# Patient Record
Sex: MALE | Race: WHITE | NOT HISPANIC OR LATINO | Employment: FULL TIME | URBAN - METROPOLITAN AREA
[De-identification: names, ages, dates, MRNs, and addresses within clinical notes are randomized per-mention and may not be internally consistent; named-entity substitution may affect disease eponyms.]

---

## 2017-08-15 ENCOUNTER — ALLSCRIPTS OFFICE VISIT (OUTPATIENT)
Dept: OTHER | Facility: OTHER | Age: 32
End: 2017-08-15

## 2017-08-15 LAB
BILIRUB UR QL STRIP: NORMAL
CLARITY UR: NORMAL
COLOR UR: YELLOW
GLUCOSE (HISTORICAL): NORMAL
HGB UR QL STRIP.AUTO: NORMAL
KETONES UR STRIP-MCNC: NORMAL MG/DL
LEUKOCYTE ESTERASE UR QL STRIP: NORMAL
NITRITE UR QL STRIP: NORMAL
PH UR STRIP.AUTO: 8 [PH]
PROT UR STRIP-MCNC: NORMAL MG/DL
SP GR UR STRIP.AUTO: 1.01
UROBILINOGEN UR QL STRIP.AUTO: NORMAL

## 2017-11-13 ENCOUNTER — GENERIC CONVERSION - ENCOUNTER (OUTPATIENT)
Dept: OTHER | Facility: OTHER | Age: 32
End: 2017-11-13

## 2017-11-13 ENCOUNTER — GENERIC CONVERSION - ENCOUNTER (OUTPATIENT)
Dept: INTERNAL MEDICINE CLINIC | Age: 32
End: 2017-11-13

## 2018-01-13 VITALS
BODY MASS INDEX: 26.77 KG/M2 | HEIGHT: 70 IN | WEIGHT: 187 LBS | TEMPERATURE: 98 F | HEART RATE: 80 BPM | DIASTOLIC BLOOD PRESSURE: 76 MMHG | SYSTOLIC BLOOD PRESSURE: 116 MMHG
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
## 2018-01-22 VITALS
HEIGHT: 70 IN | TEMPERATURE: 98.4 F | BODY MASS INDEX: 26.99 KG/M2 | RESPIRATION RATE: 16 BRPM | WEIGHT: 188.5 LBS | OXYGEN SATURATION: 98 % | SYSTOLIC BLOOD PRESSURE: 108 MMHG | DIASTOLIC BLOOD PRESSURE: 68 MMHG | HEART RATE: 77 BPM

## 2018-01-22 VITALS — OXYGEN SATURATION: 99 %

## 2018-07-06 ENCOUNTER — OFFICE VISIT (OUTPATIENT)
Dept: INTERNAL MEDICINE CLINIC | Age: 33
End: 2018-07-06
Payer: COMMERCIAL

## 2018-07-06 VITALS
OXYGEN SATURATION: 95 % | HEART RATE: 62 BPM | BODY MASS INDEX: 26.43 KG/M2 | DIASTOLIC BLOOD PRESSURE: 60 MMHG | WEIGHT: 184.6 LBS | HEIGHT: 70 IN | SYSTOLIC BLOOD PRESSURE: 96 MMHG | TEMPERATURE: 97.5 F

## 2018-07-06 DIAGNOSIS — K04.7 DENTAL ABSCESS: Primary | ICD-10-CM

## 2018-07-06 PROBLEM — K40.90 INGUINAL HERNIA: Status: ACTIVE | Noted: 2017-08-15

## 2018-07-06 PROBLEM — D17.1 LIPOMA OF TORSO: Status: ACTIVE | Noted: 2017-11-13

## 2018-07-06 PROCEDURE — 3725F SCREEN DEPRESSION PERFORMED: CPT | Performed by: INTERNAL MEDICINE

## 2018-07-06 PROCEDURE — 99213 OFFICE O/P EST LOW 20 MIN: CPT | Performed by: INTERNAL MEDICINE

## 2018-07-06 PROCEDURE — 1036F TOBACCO NON-USER: CPT | Performed by: INTERNAL MEDICINE

## 2018-07-06 PROCEDURE — 3008F BODY MASS INDEX DOCD: CPT | Performed by: INTERNAL MEDICINE

## 2018-07-06 RX ORDER — AMOXICILLIN 500 MG/1
500 CAPSULE ORAL EVERY 8 HOURS SCHEDULED
Qty: 30 CAPSULE | Refills: 0 | Status: SHIPPED | OUTPATIENT
Start: 2018-07-06 | End: 2018-07-16

## 2018-07-06 RX ORDER — IBUPROFEN 800 MG/1
800 TABLET ORAL EVERY 8 HOURS PRN
Qty: 30 TABLET | Refills: 0 | Status: SHIPPED | OUTPATIENT
Start: 2018-07-06 | End: 2019-08-06 | Stop reason: SDUPTHER

## 2018-07-08 NOTE — PROGRESS NOTES
Assessment/Plan:    No problem-specific Assessment & Plan notes found for this encounter  Diagnoses and all orders for this visit:    Dental abscess  Comments:  Patient has a toothache for 4 days is trying to get into a dentist next week  By some on amoxicillin on and ibuprofen for pain control  Orders:  -     amoxicillin (AMOXIL) 500 mg capsule; Take 1 capsule (500 mg total) by mouth every 8 (eight) hours for 10 days  -     ibuprofen (MOTRIN) 800 mg tablet; Take 1 tablet (800 mg total) by mouth every 8 (eight) hours as needed for mild pain          Subjective:      Patient ID: Aure Diez is a 29 y o  male  Patient has not been to a dentist for a while and developed a sore to last week  However the pain is increasing intensity  He has no fever, significant swelling but does have intense throbbing of his lower jaw on the right  Review of Systems   Constitutional: Negative  HENT: Positive for dental problem  Eyes: Negative  Respiratory: Negative  Cardiovascular: Negative  Objective:      BP 96/60 (BP Location: Left arm, Patient Position: Sitting, Cuff Size: Standard)   Pulse 62   Temp 97 5 °F (36 4 °C) (Tympanic)   Ht 5' 10" (1 778 m)   Wt 83 7 kg (184 lb 9 6 oz)   SpO2 95%   BMI 26 49 kg/m²          Physical Exam   Constitutional: He appears well-developed and well-nourished  HENT:   Head: Normocephalic and atraumatic  Broken and decayed to right lower jaw   Eyes: Conjunctivae are normal  Pupils are equal, round, and reactive to light  Neck: Normal range of motion  Neck supple  Cardiovascular: Regular rhythm  Pulmonary/Chest: Effort normal and breath sounds normal    Lymphadenopathy:     He has cervical adenopathy  Vitals reviewed

## 2019-04-20 ENCOUNTER — APPOINTMENT (EMERGENCY)
Dept: RADIOLOGY | Facility: HOSPITAL | Age: 34
End: 2019-04-20
Payer: COMMERCIAL

## 2019-04-20 ENCOUNTER — HOSPITAL ENCOUNTER (EMERGENCY)
Facility: HOSPITAL | Age: 34
Discharge: HOME | End: 2019-04-20
Admitting: EMERGENCY MEDICINE
Payer: COMMERCIAL

## 2019-04-20 VITALS
HEART RATE: 61 BPM | WEIGHT: 173.72 LBS | DIASTOLIC BLOOD PRESSURE: 79 MMHG | RESPIRATION RATE: 18 BRPM | OXYGEN SATURATION: 95 % | SYSTOLIC BLOOD PRESSURE: 129 MMHG | TEMPERATURE: 98.3 F | BODY MASS INDEX: 24.93 KG/M2

## 2019-04-20 DIAGNOSIS — S96.912A ANKLE STRAIN, LEFT, INITIAL ENCOUNTER: Primary | ICD-10-CM

## 2019-04-20 PROCEDURE — 73590 X-RAY EXAM OF LOWER LEG: CPT

## 2019-04-20 PROCEDURE — 99283 EMERGENCY DEPT VISIT LOW MDM: CPT | Performed by: PHYSICIAN ASSISTANT

## 2019-04-20 PROCEDURE — 99283 EMERGENCY DEPT VISIT LOW MDM: CPT

## 2019-07-08 ENCOUNTER — OFFICE VISIT (OUTPATIENT)
Dept: INTERNAL MEDICINE CLINIC | Age: 34
End: 2019-07-08
Payer: COMMERCIAL

## 2019-07-08 VITALS
DIASTOLIC BLOOD PRESSURE: 60 MMHG | HEIGHT: 70 IN | HEART RATE: 80 BPM | SYSTOLIC BLOOD PRESSURE: 124 MMHG | TEMPERATURE: 98.9 F | WEIGHT: 182 LBS | OXYGEN SATURATION: 97 % | BODY MASS INDEX: 26.05 KG/M2

## 2019-07-08 DIAGNOSIS — K04.7 DENTAL ABSCESS: Primary | ICD-10-CM

## 2019-07-08 PROBLEM — S82.142A CLOSED DISPLACED BICONDYLAR FRACTURE OF LEFT TIBIA: Status: RESOLVED | Noted: 2019-01-03 | Resolved: 2019-07-08

## 2019-07-08 PROBLEM — D17.1 LIPOMA OF TORSO: Status: RESOLVED | Noted: 2017-11-13 | Resolved: 2019-07-08

## 2019-07-08 PROBLEM — S82.142A CLOSED DISPLACED BICONDYLAR FRACTURE OF LEFT TIBIA: Status: ACTIVE | Noted: 2019-01-03

## 2019-07-08 PROCEDURE — 3725F SCREEN DEPRESSION PERFORMED: CPT | Performed by: INTERNAL MEDICINE

## 2019-07-08 PROCEDURE — 1036F TOBACCO NON-USER: CPT | Performed by: INTERNAL MEDICINE

## 2019-07-08 PROCEDURE — 99213 OFFICE O/P EST LOW 20 MIN: CPT | Performed by: INTERNAL MEDICINE

## 2019-07-08 PROCEDURE — 3008F BODY MASS INDEX DOCD: CPT | Performed by: INTERNAL MEDICINE

## 2019-07-08 RX ORDER — AMOXICILLIN AND CLAVULANATE POTASSIUM 875; 125 MG/1; MG/1
1 TABLET, FILM COATED ORAL EVERY 12 HOURS SCHEDULED
Qty: 20 TABLET | Refills: 0 | Status: SHIPPED | OUTPATIENT
Start: 2019-07-08 | End: 2019-07-18

## 2019-07-08 NOTE — PROGRESS NOTES
Assessment/Plan:    Dental abscess  Over the past week he has developed increasing jaw pain on the right radiating into his right ear                                                       Diagnoses and all orders for this visit:    Dental abscess  -     amoxicillin-clavulanate (AUGMENTIN) 875-125 mg per tablet; Take 1 tablet by mouth every 12 (twelve) hours for 10 days          Subjective:      Patient ID: Harmony Lyons is a 34 y o  male  Dental Pain    This is a new problem  The current episode started in the past 7 days  The problem occurs constantly  The problem has been gradually worsening  The pain is at a severity of 8/10  The pain is severe  Associated symptoms include facial pain and thermal sensitivity  Pertinent negatives include no difficulty swallowing, fever, oral bleeding or sinus pressure  He has tried NSAIDs and acetaminophen for the symptoms  The treatment provided no relief  Review of Systems   Constitutional: Negative for chills, fatigue, fever and unexpected weight change  HENT: Negative for congestion, ear pain, hearing loss, postnasal drip, sinus pressure, sore throat, trouble swallowing and voice change  Broken teeth with cavities   Eyes: Negative for visual disturbance  Respiratory: Negative for cough, chest tightness, shortness of breath and wheezing  Cardiovascular: Negative for chest pain, palpitations and leg swelling  Gastrointestinal: Negative for abdominal distention, abdominal pain, anal bleeding, blood in stool, constipation, diarrhea and nausea  Endocrine: Negative for cold intolerance, polydipsia, polyphagia and polyuria  Genitourinary: Negative for dysuria, flank pain, frequency, hematuria and urgency  Musculoskeletal: Negative for arthralgias, back pain, gait problem, joint swelling, myalgias and neck pain  Skin: Negative for rash  Allergic/Immunologic: Negative for immunocompromised state     Neurological: Negative for dizziness, syncope, facial asymmetry, weakness, light-headedness, numbness and headaches  Hematological: Negative for adenopathy  Psychiatric/Behavioral: Negative for confusion, sleep disturbance and suicidal ideas  Objective:      /60 (BP Location: Left arm, Patient Position: Sitting)   Pulse 80   Temp 98 9 °F (37 2 °C) (Tympanic)   Ht 5' 10" (1 778 m)   Wt 82 6 kg (182 lb)   SpO2 97%   BMI 26 11 kg/m²          Physical Exam   Constitutional: He is oriented to person, place, and time  He appears well-developed and well-nourished  No distress  HENT:   Right Ear: External ear normal    Left Ear: External ear normal    Nose: Nose normal    Mouth/Throat: No oropharyngeal exudate  Broken teeth with cavities   Eyes: Pupils are equal, round, and reactive to light  EOM are normal    Neck: Normal range of motion  Neck supple  No JVD present  No thyromegaly present  Cardiovascular: Normal rate, regular rhythm, normal heart sounds and intact distal pulses  Exam reveals no gallop  No murmur heard  Pulmonary/Chest: Effort normal and breath sounds normal  No respiratory distress  He has no wheezes  He has no rales  Abdominal: Soft  Bowel sounds are normal  He exhibits no distension and no mass  There is no tenderness  Musculoskeletal: Normal range of motion  He exhibits no tenderness  Lymphadenopathy:     He has no cervical adenopathy  Neurological: He is alert and oriented to person, place, and time  No cranial nerve deficit  Coordination normal    Skin: No rash noted  Psychiatric: He has a normal mood and affect   His behavior is normal  Judgment and thought content normal

## 2019-07-08 NOTE — PATIENT INSTRUCTIONS
Dental Abscess   AMBULATORY CARE:   A dental abscess  is a collection of pus in or around a tooth  A dental abscess is caused by bacteria  The bacteria usually enter the tooth when the enamel (outer part of the tooth) is damaged by tooth decay  Bacteria may also enter the tooth through a break or chip in the tooth, or a cut in the gum  Food particles that are stuck between the teeth for a long time may also lead to an abscess  Signs and symptoms of a dental abscess:   · Toothache, a loose tooth, or a tooth that is very sensitive to pressure or temperature    · Bad breath, unpleasant taste, and drooling    · Fever    · Pain, redness, and swelling of the gums, or swelling of your face and neck    · Pain when you open or close your mouth    · Trouble opening your mouth  Seek care immediately if:   · You have severe pain  · You have trouble breathing because of pain or swelling  Contact your healthcare provider if:   · Your symptoms get worse, even after treatment  · Your mouth is bleeding  · You cannot eat or drink because of pain or swelling  · Your abscess returns  · You have an injury that causes a crack in your tooth  · You have questions or concerns about your condition or care  Treatment:  You may  need any of the following:  · Medicines  may be given to treat a bacterial infection and decrease pain  · Incision and drainage  is a cut in the abscess to allow the pus to drain  A sample of fluid may be collected from your abscess  The fluid is sent to a lab and tested for bacteria  Ask your healthcare provider for more information  · A root canal  is a procedure to remove the bacteria and prevent more infection  It is usually done after an incision and drainage  A filling or crown will be placed over the tooth after you have healed from your root canal      · Tooth removal  may be needed if the infection affects deeper tissues   This is usually done after an incision and drainage  Self-care:   · Rinse your mouth every 2 hours with salt water  This will help keep the area clean  · Gently brush your teeth twice a day with a soft tooth brush  This will help keep the area clean  · Eat soft foods as directed  Soft foods may cause less pain  Examples include applesauce, yogurt, and cooked pasta  Ask your healthcare provider how long to follow this instruction  · Apply a warm compress to your tooth or gum  Use a cotton ball or gauze soaked in warm water  Remove the compress in 10 minutes or when it becomes cool  Repeat 3 times a day  Prevent another abscess:   · Brush your teeth at least 2 times a day with fluoride toothpaste  · Use dental floss to clean between your teeth at least once a day  · Rinse your mouth with water or mouthwash after meals and snacks  · Chew sugarless gum after meals and snacks  · Limit foods that are sticky and high in sugar such as raisons  Also limit drinks high in sugar, such as soda  · See your dentist every 6 months for dental cleanings and oral exams  Follow up with your healthcare provider in 24 hours: Your healthcare provider will need to check your teeth and gums  Write down your questions so you remember to ask them during your visits  © 2017 [employer]  Information is for End User's use only and may not be sold, redistributed or otherwise used for commercial purposes  All illustrations and images included in CareNotes® are the copyrighted property of [employer]  or [employer]  The above information is an  only  It is not intended as medical advice for individual conditions or treatments  Talk to your doctor, nurse or pharmacist before following any medical regimen to see if it is safe and effective for you  ÎÑÇÌ ÇáÓä   ÇáÑÚÇíÉ ÇáÎÇÑÌíÉ ááãÑÖì:   ÎÑÇÌ ÇáÓä  åæ ÊÌãÚ ÇáÕÏíÏ ÏÇÎá ÇáÓä Ãæ Íæáå  íÍÏË ÎÑÇÌ ÇáÓä ÈÓÈÈ ÇáÈßÊíÑíÇ   ÛÇáÈÇ ãÇ ÊÏÎá ÇáÈßÊíÑíÇ Åáì ÏÇÎá ÇáÓä ÚäÏ ÊÚÑÖ ãíäÇ ÇáÃÓäÇä (AAOPRW ÇáÎÇÑÌí ááÃÓäÇä) ááÊáÝ ÈÓÈÈ äÎÑ ÇáÃÓäÇä  ÞÏ ÊÏÎá ÇáÈßÊÑíÇ Åáì ÇáÓä ÃíÖðÇ ãä ÎáÇá ßÓÑ Ãæ ÑÞÇÆÞ ãæÌæÏÉ Ýí ÇáÓä¡ Ãæ æÌæÏ ÌÑÍ Ýí ÇááËÉ  æÞÏ ÊÄÏí ÈÞÇíÇ ÇáØÚÇã BUNDJXGZ Èíä ÇáÃÓäÇä áÝÊÑÉ ØæíáÉ Åáì ÇáÅÕÇÈÉ ÈÇáÎÑÇÌ  ÃÚÑÇÖ ÎÑÇÌ ÇáÓä æÚáÇãÇÊå:   · Ãáã ÇáÃÓäÇä Ãæ ÃÓäÇä THJEWE Ãæ ÍÓÇÓíÉ ÇáÃÓäÇä ááÖÛØ Ãæ ÇáÍÑÇÑÉ    · ÇáäÝÓ ÇáßÑíå æÇáãÐÇÞ ÛíÑ ÇáãÓÊÍÈ æÓíáÇä ÇááÚÇÈ    · ÇáÍãì    · ÇáÃáã Ãæ LHOOSAIC Ãæ ÊæÑã ÇááËÉ Ãæ ÊæÑã ÇáæÌå æÇáÑÞÈÉ    · Ãáã ÚäÏ ÝÊÍ ÇáÝã Ãæ ÅÛáÇÞå    · æÌæÏ ÕÚæÈÉ Ýí ÝÊÍ ÇáÝã  íõÑÌì ØáÈ ÇáÑÚÇíÉ ÝæÑðÇ Ýí HOWBBCB ÇáÊÇáíÉ:   · ÅÐÇ ßäÊ ÊÚÇäí ãä Ãáã ÍÇÏ    · æÇÌåÊ ãÔÇßá Ýí ÇáÊäÝÓ ÈÓÈÈ ÇáÃáã Ãæ ÇáÊæÑã  íõÑÌì IRPSTQK ÈãÞÏã ÇáÑÚÇíÉ ÇáÕÍíÉ áÏíß Ýí NINRYJX ÇáÊÇáíÉ:   · ÊÊÝÇÞã ÇáÃÚÑÇÖ¡ ÍÊì ÈÚÏ ÇáÚáÇÌ  · ßäÊ ÊÚÇäí ãä äÒíÝ Ýí ÇáÝã  · áÇ ÊÓÊØíÚ ÊäÇæá ÇáØÚÇã Ãæ ÇáÔÑÇÈ ÈÓÈÈ ÇáÃáã Ãæ ÇáÊæÑã  · ÚÇÏ Åáíß ÇáÎÑÇÌ  · ÊÚÑÖÊ áÅÕÇÈÉ ÊÓÈÈÊ Ýí ÍÏæË ßÓÑ Ýí ÃÓäÇäß  · ÅÐÇ ßÇäÊ áÏíß [id number] Ãæ ãÎÇæÝ IONOVJ ÈÍÇáÊß ÇáãÑÖíøÉ Ãæ ÈÇáÑÚÇíÉ  ÇáÚáÇÌ:  ÞÏ ÊÍÊÇÌ Åáì Ãí ããÇ íáí:  · Åä ÇáÃÏæíÉ  ÞÏ ÊõæÕÝ áÚáÇÌ ÚÏæì ÈßÊíÑíÉ æÊÎÝíÝ ÇáÃáã  · ÇáÔÞ æÇáÊÕÑíÝ  åæ ÞØÚ Ýí ÇáÎÑÇÌ ááÓãÇÍ ÈÊÕÑíÝ ÇáÕÏíÏ  ÞÏ íÊã ÃÎÐ ÚíäÉ ãä ORNCLU ÇáäÇÊÌ ãä ÇáÎÑÇÌ  íÊã ÅÑÓÇá HRKEGL Åáì ãÎÊÈÑ æÇáÊÍÞÞ ãä æÌæÏ ÈßÊíÑíÇ  ÇØáÈ ãä ãÞÏã ÇáÑÚÇíÉ ÇáÕÍíÉ ÇáãÒíÏ ãä [id number]  · äÝÞ ÌÐÑ ÇáÓä  ÚÈÇÑÉ Úä ÅÌÑÇÁ íÊã áÅÒÇáÉ ÇáÈßÊíÑíÇ æãäÚ ÊÝÇÞã ÇáÚÏæì  æÚÇÏÉ ãÇ íÊã ÈÚÏ ÇáÔÞ æÇáÊÕÑíÝ  æíÊã æÖÚ ÍÔæ Ãæ ÊÇÌ Úáì ÇáÓä ÈÚÏ ÇáÔÝÇÁ ãä äÝÞ ÌÐÑ ÇáÓä  · ÎáÚ ÇáÓä  ÞÏ ÊÍÊÇÌ Åáíå ÅÐÇ ÃËÑÊ ÇáÚÏæì Úáì ÇáÃäÓÌÉ ÇáÃßËÑ ÚãÞðÇ  æåÐÇ ÚÇÏÉ ãÇ íÊã ÈÚÏ ÇáÔÞ æÇáÊÕÑíÝ  ÇáÑÚÇíÉ ÇáÐÇÊíÉ:   · Þã ÈÔØÝ ÇáÝã ßá ÓÇÚÊíä ÈÇáãÇÁ ÇáãÇáÍ  ÝåÐÇ íÍÇÝÙ Úáì äÙÇÝÉ ÇáãäØÞÉ  · ÇÛÓá ÃÓäÇäß ÈáØÝ ãÑÊíä Ýí Çáíæã ÈÝÑÔÇÉ ÃÓäÇä äÇÚãÉ  ÝåÐÇ íÍÇÝÙ Úáì äÙÇÝÉ ÇáãäØÞÉ  · ÊäÇæá ÃØÚãÉ áíäÉ æÝÞðÇ ááÅÑÔÇÏÇÊ  ÞÏ áÇ ÊÓÈÈ ÇáÃØÚãÉ ÇááíäÉ ÃáãðÇ ßÈíÑðÇ  ãËá ÕáÕÉ ÇáÊÝÇÍ¡ æÇáÒÈÇÏí¡ [id number] SAHLFIGX  ÇÓÊÝÓÑ ãä ãÞÏã ÇáÑÚÇíÉ ÇáÕÍíÉ Úä ãÏÉ ÇÊÈÇÚ åÐå [id number]  · ÖÚ ßãÇÏÉ ÏÇÝÆÉ Úáì ÇáÃÓäÇä Ãæ ÇááËÉ  ÇÓÊÎÏã ßÑÉ ÞØäíÉ Ãæ ÔÇÔ ãäÞæÚðÇ Ýí ãÇÁ ÏÇÝÆ  Þã TDINHH ÇáÖãÇÏÉ ÈÚÏ ãÑæÑ 10 ÏÞÇÆÞ Ãæ ÚäÏãÇ ÊÕÈÍ ÈÇÑÏÉ  ßÑÑ ÇáÃãÑ 3 ãÑÇÊ ÈÇáíæã  ãäÚ ÍÏæË ÎÑÇÌ ÂÎÑ:   · ÇÛÓá ÃÓäÇäß ãÑÊíä íæãíðÇ Úáì íæãíðÇ ÈãÚÌæä ÃÓäÇä Ûäí [id number]  · ÇÓÊÎÏã ÎíØ ÊäÙíÝ ÇáÃÓäÇä ÇáØÈí ááÊäÙíÝ Èíä ÇáÃÓäÇä ãÑÉ æÇÍÏÉ Úáì ÇáÃÞá íæãíðÇ  · ÇÔØÝ Ýãß ÈÇáãÇÁ Ãæ ÛÓæá ÇáÝã ÈÚÏ ÊäÇæá ÇáæÌÈÇÊ æÇáæÌÈÇÊ ÇáÎÝíÝÉ  · ÇãÖÛ Úáß ãäÒæÚ ÇáÓßÑ ÈÚÏ ÊäÇæá ÇáæÌÈÇÊ æÇáæÌÈÇÊ ÇáÎÝíÝÉ  · Þáá ãä ÇáÃØÚãÉ ÇááÒÌÉ æÇáÊí ÊÍÊæí Úáì äÓÈ ãÑÊÝÚÉ ãä ÇáÓßÑ ãËá ÇáÒÈíÈ  ßãÇ Þáá ÃíÖðÇ ãä KZPODIVZR ÇáãÍÊæíÉ Úáì äÓÈ ãÑÊÝÚÉ ãä ÇáÓßÑ ãËá ÇáãÔÑæÈÇÊ ÇáÛÇÒíÉ  · Þã ÈÒíÇÑÉ ØÈíÈ ÇáÃÓäÇä ÇáÐí ÊÊÚÇãá ãÚå ßá 6 ÃÔåÑ áÊäÙíÝ ÇáÃÓäÇä æÅÌÑÇÁ [id number] ÇáÝãæíÉ  ÊÇÈÚ ãÚ ãÞÏã ÇáÑÚÇíÉ ÇáÕÍíÉ ÎáÇá 24 ÓÇÚÉ:  ÓíÍÊÇÌ ãÞÏã ÇáÑÚÇíÉ ÇáÕÍíÉ Åáì ÝÍÕ ÃÓäÇäß æáËÊß  íõÑÌì ÊÏæíä ÃÓÆáÊß áÊÊÐßøÑ ØÑÍåÇ ÃËäÇÁ ÒíÇÑÇÊß ááØÈíÈ  © 2017 [address] [employer] Information is for End User's use only and may not be sold, redistributed or otherwise used for commercial purposes  All illustrations and images included in CareNotes® are the copyrighted property of [employer] , [employer]  or Louis Bella  The above information is an  only  It is not intended as medical advice for individual conditions or treatments  Talk to your doctor, nurse or pharmacist before following any medical regimen to see if it is safe and effective for you

## 2019-08-06 ENCOUNTER — TELEPHONE (OUTPATIENT)
Dept: INTERNAL MEDICINE CLINIC | Age: 34
End: 2019-08-06

## 2019-08-06 DIAGNOSIS — K04.7 DENTAL ABSCESS: ICD-10-CM

## 2019-08-06 RX ORDER — CLINDAMYCIN HYDROCHLORIDE 150 MG/1
150 CAPSULE ORAL 3 TIMES DAILY
Refills: 2 | COMMUNITY
Start: 2019-07-31 | End: 2019-12-30

## 2019-08-06 RX ORDER — OXYCODONE HYDROCHLORIDE 5 MG/1
5 TABLET ORAL EVERY 6 HOURS PRN
Qty: 10 TABLET | Refills: 0 | Status: SHIPPED | OUTPATIENT
Start: 2019-08-06

## 2019-08-06 RX ORDER — IBUPROFEN 800 MG/1
800 TABLET ORAL EVERY 8 HOURS PRN
Qty: 30 TABLET | Refills: 0 | Status: SHIPPED | OUTPATIENT
Start: 2019-08-06

## 2019-12-30 ENCOUNTER — OFFICE VISIT (OUTPATIENT)
Dept: INTERNAL MEDICINE CLINIC | Age: 34
End: 2019-12-30
Payer: COMMERCIAL

## 2019-12-30 VITALS
HEIGHT: 70 IN | SYSTOLIC BLOOD PRESSURE: 130 MMHG | HEART RATE: 64 BPM | WEIGHT: 184.4 LBS | TEMPERATURE: 100.7 F | OXYGEN SATURATION: 98 % | DIASTOLIC BLOOD PRESSURE: 80 MMHG | BODY MASS INDEX: 26.4 KG/M2

## 2019-12-30 DIAGNOSIS — J06.9 UPPER RESPIRATORY TRACT INFECTION, UNSPECIFIED TYPE: ICD-10-CM

## 2019-12-30 DIAGNOSIS — J11.1 INFLUENZA: Primary | ICD-10-CM

## 2019-12-30 PROCEDURE — 3008F BODY MASS INDEX DOCD: CPT | Performed by: INTERNAL MEDICINE

## 2019-12-30 PROCEDURE — 1036F TOBACCO NON-USER: CPT | Performed by: INTERNAL MEDICINE

## 2019-12-30 PROCEDURE — 99213 OFFICE O/P EST LOW 20 MIN: CPT | Performed by: INTERNAL MEDICINE

## 2019-12-30 PROCEDURE — 3725F SCREEN DEPRESSION PERFORMED: CPT | Performed by: INTERNAL MEDICINE

## 2019-12-30 RX ORDER — OSELTAMIVIR PHOSPHATE 75 MG/1
75 CAPSULE ORAL 2 TIMES DAILY
Qty: 10 CAPSULE | Refills: 0 | Status: SHIPPED | OUTPATIENT
Start: 2019-12-30 | End: 2020-01-04

## 2019-12-30 RX ORDER — AMOXICILLIN 500 MG/1
500 CAPSULE ORAL EVERY 8 HOURS SCHEDULED
Qty: 30 CAPSULE | Refills: 0 | Status: SHIPPED | OUTPATIENT
Start: 2019-12-30 | End: 2020-01-09

## 2019-12-30 NOTE — PATIENT INSTRUCTIONS
Influenza   AMBULATORY CARE:   Influenza  (the flu) is an infection caused by the influenza virus  The flu is easily spread when an infected person coughs, sneezes, or has close contact with others  You may be able to spread the flu to others for 1 week or longer after signs or symptoms appear  Common signs and symptoms include the following:   · Fever and chills    · Headaches, body aches, and muscle or joint pain    · Cough, runny nose, and sore throat    · Loss of appetite, nausea, vomiting, or diarrhea    · Tiredness    · Trouble breathing  Call 911 for any of the following:   · You have trouble breathing, and your lips look purple or blue  · You have a seizure  Seek care immediately if:   · You are dizzy, or you are urinating less or not at all  · You have a headache with a stiff neck, and you feel tired or confused  · You have new pain or pressure in your chest     · Your symptoms, such as shortness of breath, vomiting, or diarrhea, get worse  · Your symptoms, such as fever and coughing, seem to get better, but then get worse  Contact your healthcare provider if:   · You have new muscle pain or weakness  · You have questions or concerns about your condition or care  Treatment for influenza  may include any of the following:  · Acetaminophen  decreases pain and fever  It is available without a doctor's order  Ask how much to take and how often to take it  Follow directions  Acetaminophen can cause liver damage if not taken correctly  · NSAIDs , such as ibuprofen, help decrease swelling, pain, and fever  This medicine is available with or without a doctor's order  NSAIDs can cause stomach bleeding or kidney problems in certain people  If you take blood thinner medicine, always ask your healthcare provider if NSAIDs are safe for you  Always read the medicine label and follow directions  · Antivirals  help fight a viral infection    Manage your symptoms:   · Rest  as much as you can to help you recover  · Drink liquids as directed  to help prevent dehydration  Ask how much liquid to drink each day and which liquids are best for you  Prevent the spread of the flu:   · Wash your hands often  Use soap and water  Wash your hands after you use the bathroom, change a child's diapers, or sneeze  Wash your hands before you prepare or eat food  Use gel hand cleanser when soap and water are not available  Do not touch your eyes, nose, or mouth unless you have washed your hands first            · Cover your mouth when you sneeze or cough  Cough into a tissue or the bend of your arm  · Clean shared items with a germ-killing   Clean table surfaces, doorknobs, and light switches  Do not share towels, silverware, and dishes with people who are sick  Wash bed sheets, towels, silverware, and dishes with soap and water  · Wear a mask  over your mouth and nose if you are sick or are near anyone who is sick  · Stay away from others  if you are sick  · Influenza vaccine  helps prevent influenza (flu)  Everyone older than 6 months should get a yearly influenza vaccine  Get the vaccine as soon as it is available, usually in September or October each year  Follow up with your healthcare provider as directed:  Write down your questions so you remember to ask them during your visits  © 2017 [facility] Information is for End User's use only and may not be sold, redistributed or otherwise used for commercial purposes  All illustrations and images included in CareNotes® are the copyrighted property of A D A M , Inc  or Louis Bella  The above information is an  only  It is not intended as medical advice for individual conditions or treatments  Talk to your doctor, nurse or pharmacist before following any medical regimen to see if it is safe and effective for you

## 2019-12-30 NOTE — ASSESSMENT & PLAN NOTE
He also is complaining of nasal congestion and postnasal drip and what he thinks is a tooth infection    Will treat him also with amoxicillin for same but actually feel it is related to his URI

## 2019-12-30 NOTE — PROGRESS NOTES
Assessment/Plan:    Influenza  Patient became sick 48 hours ago with severe body aches fever chills and a dry cough  He did not have the flu shot  Will treat him with Tamiflu    Upper respiratory tract infection  He also is complaining of nasal congestion and postnasal drip and what he thinks is a tooth infection  Will treat him also with amoxicillin for same but actually feel it is related to his URI       Diagnoses and all orders for this visit:    Influenza  -     oseltamivir (TAMIFLU) 75 mg capsule; Take 1 capsule (75 mg total) by mouth 2 (two) times a day for 5 days    Upper respiratory tract infection, unspecified type  -     amoxicillin (AMOXIL) 500 mg capsule; Take 1 capsule (500 mg total) by mouth every 8 (eight) hours for 10 days          Subjective:      Patient ID: Arlette Jaeger is a 30 y o  male  Patient became acutely ill 48 hours ago when he developed a nonproductive cough severe body aches with fever and chills  The whole family is currently sick  He did not have a flu shot  He also states that it feels like he may be developing an abscess in the back of his mouth  That discomfort is only started since he has been sick  Review of Systems   Constitutional: Positive for chills, diaphoresis, fatigue and fever  Negative for unexpected weight change  HENT: Positive for congestion, dental problem and postnasal drip  Negative for ear pain, hearing loss, sinus pressure, sore throat, trouble swallowing and voice change  Eyes: Negative for visual disturbance  Respiratory: Positive for cough  Negative for chest tightness, shortness of breath and wheezing  Cardiovascular: Negative for chest pain, palpitations and leg swelling  Gastrointestinal: Negative for abdominal distention, abdominal pain, anal bleeding, blood in stool, constipation, diarrhea and nausea  Endocrine: Negative for cold intolerance, polydipsia, polyphagia and polyuria     Genitourinary: Negative for dysuria, flank pain, frequency, hematuria and urgency  Musculoskeletal: Negative for arthralgias, back pain, gait problem, joint swelling, myalgias and neck pain  Skin: Negative for rash  Allergic/Immunologic: Negative for immunocompromised state  Neurological: Negative for dizziness, syncope, facial asymmetry, weakness, light-headedness, numbness and headaches  Hematological: Negative for adenopathy  Psychiatric/Behavioral: Negative for confusion, sleep disturbance and suicidal ideas  Objective:      /80 (BP Location: Left arm, Patient Position: Sitting)   Pulse 64   Temp (!) 100 7 °F (38 2 °C) (Tympanic)   Ht 5' 10" (1 778 m)   Wt 83 6 kg (184 lb 6 4 oz)   SpO2 98%   BMI 26 46 kg/m²          Physical Exam   Constitutional: He is oriented to person, place, and time  He appears well-developed and well-nourished  He appears distressed  HENT:   Right Ear: External ear normal    Left Ear: External ear normal    Mouth/Throat: Oropharynx is clear and moist  No oropharyngeal exudate  Nasal congestion with clear discharge   Eyes: Pupils are equal, round, and reactive to light  EOM are normal    Neck: Normal range of motion  Neck supple  No JVD present  No thyromegaly present  Cardiovascular: Normal rate, regular rhythm, normal heart sounds and intact distal pulses  Exam reveals no gallop  No murmur heard  Pulmonary/Chest: Effort normal and breath sounds normal  No respiratory distress  He has no wheezes  He has no rales  Abdominal: Soft  Bowel sounds are normal  He exhibits no distension and no mass  There is no tenderness  Musculoskeletal: Normal range of motion  He exhibits no tenderness  Lymphadenopathy:     He has no cervical adenopathy  Neurological: He is alert and oriented to person, place, and time  No cranial nerve deficit  Coordination normal    Skin: No rash noted  Psychiatric: He has a normal mood and affect   His behavior is normal  Judgment and thought content normal

## 2019-12-30 NOTE — ASSESSMENT & PLAN NOTE
Patient became sick 48 hours ago with severe body aches fever chills and a dry cough  He did not have the flu shot    Will treat him with Tamiflu

## 2023-02-15 ENCOUNTER — HOSPITAL ENCOUNTER (EMERGENCY)
Facility: HOSPITAL | Age: 38
Discharge: HOME | End: 2023-02-15
Attending: EMERGENCY MEDICINE

## 2023-02-15 VITALS
HEART RATE: 57 BPM | OXYGEN SATURATION: 99 % | RESPIRATION RATE: 16 BRPM | DIASTOLIC BLOOD PRESSURE: 93 MMHG | SYSTOLIC BLOOD PRESSURE: 152 MMHG | TEMPERATURE: 98.3 F

## 2023-02-15 DIAGNOSIS — I10 HIGH BLOOD PRESSURE: ICD-10-CM

## 2023-02-15 DIAGNOSIS — K08.89 PAIN, DENTAL: Primary | ICD-10-CM

## 2023-02-15 RX ORDER — AMOXICILLIN AND CLAVULANATE POTASSIUM 875; 125 MG/1; MG/1
1 TABLET, FILM COATED ORAL ONCE
Status: COMPLETED | OUTPATIENT
Start: 2023-02-15 | End: 2023-02-15

## 2023-02-15 RX ORDER — OXYCODONE HYDROCHLORIDE 5 MG/1
5 TABLET ORAL ONCE
Status: COMPLETED | OUTPATIENT
Start: 2023-02-15 | End: 2023-02-15

## 2023-02-15 RX ORDER — OXYCODONE HYDROCHLORIDE 5 MG/1
5 CAPSULE ORAL EVERY 6 HOURS PRN
Qty: 10 CAPSULE | Refills: 0 | Status: SHIPPED | OUTPATIENT
Start: 2023-02-15

## 2023-02-15 RX ORDER — AMOXICILLIN AND CLAVULANATE POTASSIUM 875; 125 MG/1; MG/1
1 TABLET, FILM COATED ORAL 2 TIMES DAILY
Qty: 9 TABLET | Refills: 0 | Status: SHIPPED | OUTPATIENT
Start: 2023-02-15 | End: 2023-02-20

## 2023-02-15 RX ADMIN — AMOXICILLIN AND CLAVULANATE POTASSIUM 1 TABLET: 875; 125 TABLET, FILM COATED ORAL at 20:56

## 2023-02-15 RX ADMIN — OXYCODONE 5 MG: 5 TABLET ORAL at 20:56

## 2023-02-16 NOTE — DISCHARGE INSTRUCTIONS
Diagnosis; dental disease -- high blood pressure 150/90    - please call  the oral surgeon to schedule an appointment    - for pain- oxycodone 1 tablet on as as needed basis- can use 1 every 6 hrs     - augmentin - antibiotic- starting tomorrow 1 tablet 2 times a day for 5 days    - please return to  the er for any fevers- temp > 100 4/ any facial redness/swelling/ any swelling under tongue or swelling redness of anterior neck     - your blood pressure was mildly elevated in the er 150/90-  the er is not the best place to check your blood pressure - especially if you are in pain -- if you check your blood pressure when you are pain free/ relaxed if should be persistently  under 140/90- if it is persistently greater- will need to call your primary doctor to schedule an appointment

## 2023-02-18 NOTE — ED PROVIDER NOTES
History  Chief Complaint   Patient presents with   • Dental Problem     Pt reprots he has multiple bad teeth  Recent increase in pain and swelling  No appt with dentist at this point  30 yr male with hx of dental caries c/o worsening of r back lower tooth pain over last several days-- no fevers- no facial/neck swelling      History provided by:  Patient   used: No        Prior to Admission Medications   Prescriptions Last Dose Informant Patient Reported? Taking?   ibuprofen (MOTRIN) 800 mg tablet   No No   Sig: Take 1 tablet (800 mg total) by mouth every 8 (eight) hours as needed for mild pain   oxyCODONE (ROXICODONE) 5 mg immediate release tablet   No No   Sig: Take 1 tablet (5 mg total) by mouth every 6 (six) hours as needed for severe pain for up to 10 dosesMax Daily Amount: 20 mg      Facility-Administered Medications: None       No past medical history on file  Past Surgical History:   Procedure Laterality Date   • ORIF TIBIAL SHAFT FRACTURE W/ PLATES AND SCREWS Left        Family History   Problem Relation Age of Onset   • No Known Problems Mother    • No Known Problems Father      I have reviewed and agree with the history as documented  E-Cigarette/Vaping   • E-Cigarette Use Current Every Day User    • Cartridges/Day 1      E-Cigarette/Vaping Substances     Social History     Tobacco Use   • Smoking status: Never   • Smokeless tobacco: Never   Vaping Use   • Vaping Use: Every day   Substance Use Topics   • Alcohol use: No   • Drug use: Yes     Types: Marijuana       Review of Systems   Constitutional: Negative  HENT: Positive for dental problem  Negative for congestion, drooling, ear discharge, ear pain, facial swelling, hearing loss, mouth sores, nosebleeds, postnasal drip, rhinorrhea, sinus pressure, sinus pain, sneezing, sore throat, tinnitus, trouble swallowing and voice change  Eyes: Negative  Respiratory: Negative  Cardiovascular: Negative  Gastrointestinal: Negative  Endocrine: Negative  Genitourinary: Negative  Musculoskeletal: Negative  Skin: Negative  Allergic/Immunologic: Negative  Neurological: Negative  Hematological: Negative  Psychiatric/Behavioral: Negative  Physical Exam  Physical Exam  Vitals and nursing note reviewed  Constitutional:       General: He is not in acute distress  Appearance: Normal appearance  He is not ill-appearing, toxic-appearing or diaphoretic  Comments: avss- htnsive- pulse ox 99 % on ra- interpretation is normal- no intervention    HENT:      Mouth/Throat:      Mouth: Mucous membranes are moist       Pharynx: Oropharynx is clear  No oropharyngeal exudate or posterior oropharyngeal erythema  Comments: Rooted tooth #32 - no periapical/buccal abscess -- normal sublingual space- no overlying facial edema/ erythema  Neck:      Vascular: No carotid bruit  Comments: No anterior cervical edema/ erythema   Musculoskeletal:      Cervical back: Normal range of motion and neck supple  No rigidity or tenderness  Lymphadenopathy:      Cervical: No cervical adenopathy  Neurological:      Mental Status: He is alert           Vital Signs  ED Triage Vitals [02/15/23 1844]   Temperature Pulse Respirations Blood Pressure SpO2   98 3 °F (36 8 °C) 57 16 152/93 99 %      Temp Source Heart Rate Source Patient Position - Orthostatic VS BP Location FiO2 (%)   Oral -- -- -- --      Pain Score       9           Vitals:    02/15/23 1844   BP: 152/93   Pulse: 57         Visual Acuity      ED Medications  Medications   amoxicillin-clavulanate (AUGMENTIN) 875-125 mg per tablet 1 tablet (1 tablet Oral Given 2/15/23 2056)   oxyCODONE (ROXICODONE) IR tablet 5 mg (5 mg Oral Given 2/15/23 2056)       Diagnostic Studies  Results Reviewed     None                 No orders to display              Procedures  Procedures         ED Course                                             Medical Decision Making  Pt with long standing dental vicenta/ rotted tooth -- with no signs of buccal/periapical abscess- no signs of ludwigs angina-- on exam -- pt will need antibx/ symptom control and referral for tooth removal     High blood pressure: acute illness or injury     Details: pt instucted to check bp when pain free and  monitor   Pain, dental: acute illness or injury     Details: acute on chronic- see above   Amount and/or Complexity of Data Reviewed  Discussion of management or test interpretation with external provider(s): Mild amount of er md thought complexity     Risk  Prescription drug management  Disposition  Final diagnoses:   Pain, dental   High blood pressure     Time reflects when diagnosis was documented in both MDM as applicable and the Disposition within this note     Time User Action Codes Description Comment    2/15/2023  9:12 PM Zygmunt Devin Add [K08 89] Pain, dental     2/15/2023  9:12 PM Zygmunt Devin Add [I10] High blood pressure       ED Disposition     ED Disposition   Discharge    Condition   Stable    Date/Time   Wed Feb 15, 2023  9:20 PM    Comment   Karmen Dennis discharge to home/self care                 Follow-up Information     Follow up With Specialties Details Why [address] [facility] [address]  Call today  [address]          Discharge Medication List as of 2/15/2023  9:20 PM      START taking these medications    Details   amoxicillin-clavulanate (Augmentin) 875-125 mg per tablet Take 1 tablet by mouth 2 (two) times a day for 9 doses, Starting Wed 2/15/2023, Until Mon 2/20/2023, Print      oxyCODONE (OXY-IR) 5 MG capsule Take 1 capsule (5 mg total) by mouth every 6 (six) hours as needed for severe pain for up to 10 doses Can substitute oxycodone tablets for capsules only as needed for severe pain Max Daily Amount: 20 mg, Starting Wed 2/15/2023, Print         CONTINUE these medications which have NOT CHANGED    Details   ibuprofen (MOTRIN) 800 mg tablet Take 1 tablet (800 mg total) by mouth every 8 (eight) hours as needed for mild pain, Starting Tue 8/6/2019, Normal      oxyCODONE (ROXICODONE) 5 mg immediate release tablet Take 1 tablet (5 mg total) by mouth every 6 (six) hours as needed for severe pain for up to 10 dosesMax Daily Amount: 20 mg, Starting Tue 8/6/2019, Normal             No discharge procedures on file      PDMP Review     None          ED Provider  Electronically Signed by           Callie Swain MD  02/18/23 [id number]       Callie Swain MD  02/18/23 [id number]

## 2023-03-01 ENCOUNTER — OFFICE VISIT (OUTPATIENT)
Dept: FAMILY MEDICINE CLINIC | Facility: CLINIC | Age: 38
End: 2023-03-01

## 2023-03-01 VITALS
HEART RATE: 72 BPM | TEMPERATURE: 97.9 F | BODY MASS INDEX: 25.05 KG/M2 | WEIGHT: 175 LBS | DIASTOLIC BLOOD PRESSURE: 80 MMHG | SYSTOLIC BLOOD PRESSURE: 120 MMHG | OXYGEN SATURATION: 98 % | HEIGHT: 70 IN

## 2023-03-01 DIAGNOSIS — H66.92 OTITIS OF LEFT EAR: Primary | ICD-10-CM

## 2023-03-01 PROBLEM — J06.9 UPPER RESPIRATORY TRACT INFECTION: Status: RESOLVED | Noted: 2019-12-30 | Resolved: 2023-03-01

## 2023-03-01 PROBLEM — K04.7 DENTAL ABSCESS: Status: RESOLVED | Noted: 2019-07-08 | Resolved: 2023-03-01

## 2023-03-01 RX ORDER — AMOXICILLIN AND CLAVULANATE POTASSIUM 875; 125 MG/1; MG/1
1 TABLET, FILM COATED ORAL EVERY 12 HOURS SCHEDULED
Qty: 20 TABLET | Refills: 0 | Status: SHIPPED | OUTPATIENT
Start: 2023-03-01 | End: 2023-03-11

## 2023-03-01 NOTE — PROGRESS NOTES
Name: Vicky Blake      : 1985      MRN: [id number]  Encounter Provider: Kingston Matamoros MD  Encounter Date: 3/1/2023   Encounter department: [address] [place] [address]  Otitis of left ear  Assessment & Plan:  Patient on exam left ear is red and bulging  We will treat with Augmentin     Orders:  -     amoxicillin-clavulanate (Augmentin) 875-125 mg per tablet; Take 1 tablet by mouth every 12 (twelve) hours for 10 days           Subjective      Earache   There is pain in the left ear  This is a new problem  The current episode started in the past 7 days  The problem occurs constantly  The problem has been gradually worsening  The maximum temperature recorded prior to his arrival was 100 4 - 100 9 F  The pain is at a severity of 7/10  The pain is severe  Associated symptoms include coughing, headaches, rhinorrhea and a sore throat  Pertinent negatives include no abdominal pain, ear discharge, rash or vomiting  He has tried acetaminophen and NSAIDs for the symptoms  The treatment provided no relief  There is no history of a chronic ear infection  Review of Systems   Constitutional: Negative for chills and fever  HENT: Positive for congestion, ear pain, postnasal drip, rhinorrhea, sinus pressure, sinus pain and sore throat  Negative for ear discharge  Eyes: Negative for pain and visual disturbance  Respiratory: Positive for cough  Negative for shortness of breath  Cardiovascular: Negative for chest pain and palpitations  Gastrointestinal: Negative for abdominal pain and vomiting  Genitourinary: Negative for dysuria and hematuria  Musculoskeletal: Negative for arthralgias and back pain  Skin: Negative for color change and rash  Neurological: Positive for headaches  Negative for seizures and syncope  All other systems reviewed and are negative        Current Outpatient Medications on File Prior to Visit   Medication Sig   • ibuprofen (MOTRIN) 800 mg tablet Take 1 tablet (800 mg total) by mouth every 8 (eight) hours as needed for mild pain   • [DISCONTINUED] oxyCODONE (OXY-IR) 5 MG capsule Take 1 capsule (5 mg total) by mouth every 6 (six) hours as needed for severe pain for up to 10 doses Can substitute oxycodone tablets for capsules only as needed for severe pain Max Daily Amount: 20 mg   • [DISCONTINUED] oxyCODONE (ROXICODONE) 5 mg immediate release tablet Take 1 tablet (5 mg total) by mouth every 6 (six) hours as needed for severe pain for up to 10 dosesMax Daily Amount: 20 mg       Objective     /80 (BP Location: Left arm, Patient Position: Sitting, Cuff Size: Standard)   Pulse 72   Temp 97 9 °F (36 6 °C)   Ht 5' 10" (1 778 m)   Wt 79 4 kg (175 lb)   SpO2 98%   BMI 25 11 kg/m²     Physical Exam  Campos Goff MD

## 2023-03-01 NOTE — PATIENT INSTRUCTIONS
Earache   WHAT YOU NEED TO KNOW:   What causes an earache? An earache can be caused by a problem within your ear  A problem or condition in another body area can also cause pain that travels to your ear  An earache can be caused by any of the following:  Infection of the inner or outer ear    Earwax buildup, or small objects put into your ear    Ear injury caused by a cotton swab or by air pressure changes from a plane ride or scuba diving    Other infections, such as tonsillitis or pharyngitis    Jaw or dental problems such as cavities or TMJ    Neck pain caused by problems such as arthritis in your upper spine       How is an earache diagnosed? Your healthcare provider will examine your ears, head, neck, and mouth  He or she will also ask you to describe your symptoms  You may also need the following: Audiometry  is a test used to check for hearing loss  Your healthcare provider will play sounds at different volumes to check how much you can hear  Tympanometry  is a test used to check pressure changes that may be a sign of problems with your inner ear  How is an earache treated? Earaches usually are not serious and go away on their own without treatment  The following can help make you more comfortable:  Acetaminophen  decreases pain and fever  It is available without a doctor's order  Ask how much to take and how often to take it  Follow directions  Read the labels of all other medicines you are using to see if they also contain acetaminophen, or ask your doctor or pharmacist  Acetaminophen can cause liver damage if not taken correctly  NSAIDs , such as ibuprofen, help decrease swelling, pain, and fever  This medicine is available with or without a doctor's order  NSAIDs can cause stomach bleeding or kidney problems in certain people  If you take blood thinner medicine, always ask your healthcare provider if NSAIDs are safe for you  Always read the medicine label and follow directions      Do not give aspirin to children younger than 18 years  Your child could develop Reye syndrome if he or she has the flu or a fever and takes aspirin  Reye syndrome can cause life-threatening brain and liver damage  Check your child's medicine labels for aspirin or salicylates  When should I call my doctor? You have a severe earache  You have hearing loss, dizziness, a feeling of fullness in your ear, or ringing in your ears  Your ear pain worsens or does not go away with treatment  You have drainage from your ear  You have a fever  Your outer ear becomes red, swollen, and warm  You have questions or concerns about your condition or care  CARE AGREEMENT:   You have the right to help plan your care  Learn about your health condition and how it may be treated  Discuss treatment options with your healthcare providers to decide what care you want to receive  You always have the right to refuse treatment  The above information is an  only  It is not intended as medical advice for individual conditions or treatments  Talk to your doctor, nurse or pharmacist before following any medical regimen to see if it is safe and effective for you  © Copyright [employer] 2022 Information is for End User's use only and may not be sold, redistributed or otherwise used for commercial purposes

## 2023-11-03 ENCOUNTER — APPOINTMENT (OUTPATIENT)
Dept: URGENT CARE | Facility: CLINIC | Age: 38
End: 2023-11-03
Payer: OTHER MISCELLANEOUS

## 2023-11-03 PROCEDURE — 99284 EMERGENCY DEPT VISIT MOD MDM: CPT | Performed by: PHYSICIAN ASSISTANT

## 2023-11-03 PROCEDURE — G0383 LEV 4 HOSP TYPE B ED VISIT: HCPCS | Performed by: PHYSICIAN ASSISTANT

## 2024-02-21 PROBLEM — J11.1 INFLUENZA: Status: RESOLVED | Noted: 2019-12-30 | Resolved: 2024-02-21

## 2025-03-19 ENCOUNTER — TELEPHONE (OUTPATIENT)
Dept: FAMILY MEDICINE CLINIC | Facility: CLINIC | Age: 40
End: 2025-03-19

## 2025-03-19 NOTE — TELEPHONE ENCOUNTER
----- Message from Helen SHARMA sent at 3/19/2025 10:14 AM EDT -----  Regarding: Appt  Patient due for appointment, contact to schedule or let me know to update PCP.

## 2025-08-19 ENCOUNTER — OFFICE VISIT (OUTPATIENT)
Dept: FAMILY MEDICINE CLINIC | Facility: CLINIC | Age: 40
End: 2025-08-19

## 2025-08-19 VITALS
HEIGHT: 70 IN | WEIGHT: 180.8 LBS | BODY MASS INDEX: 25.88 KG/M2 | HEART RATE: 75 BPM | TEMPERATURE: 98.4 F | SYSTOLIC BLOOD PRESSURE: 112 MMHG | OXYGEN SATURATION: 98 % | DIASTOLIC BLOOD PRESSURE: 80 MMHG

## 2025-08-19 DIAGNOSIS — N50.89 TESTICLE LUMP: Primary | ICD-10-CM

## 2025-08-19 PROCEDURE — 99213 OFFICE O/P EST LOW 20 MIN: CPT | Performed by: INTERNAL MEDICINE
